# Patient Record
Sex: FEMALE | Race: WHITE | NOT HISPANIC OR LATINO | Employment: OTHER | URBAN - METROPOLITAN AREA
[De-identification: names, ages, dates, MRNs, and addresses within clinical notes are randomized per-mention and may not be internally consistent; named-entity substitution may affect disease eponyms.]

---

## 2024-06-17 PROBLEM — K42.9 UMBILICAL HERNIA WITHOUT OBSTRUCTION AND WITHOUT GANGRENE: Status: ACTIVE | Noted: 2024-06-17

## 2024-08-06 PROBLEM — I20.89 ANGINAL EQUIVALENT: Status: ACTIVE | Noted: 2024-08-06

## 2024-08-06 PROBLEM — I25.118 CORONARY ARTERY DISEASE OF NATIVE ARTERY OF NATIVE HEART WITH STABLE ANGINA PECTORIS: Status: ACTIVE | Noted: 2024-08-06

## 2024-08-06 PROBLEM — R94.39 ABNORMAL MYOCARDIAL PERFUSION STUDY: Status: ACTIVE | Noted: 2024-08-06

## 2024-08-26 ENCOUNTER — TELEPHONE (OUTPATIENT)
Dept: UROGYNECOLOGY | Facility: CLINIC | Age: 69
End: 2024-08-26

## 2024-08-26 NOTE — TELEPHONE ENCOUNTER
----- Message from Gisselle Arellano MD sent at 8/26/2024 10:34 AM CDT -----  Regarding: please call patient and schedule her  Hi: I don't see that we have we ever seen this patient. If she would like to make an appt, we can see her (christelle Noriega, Dr. JONES) in [place], or she can make appt (virtual) with any provider to talk about things 1st.  ----- Message -----  From: Shy Molina MA  Sent: 8/26/2024  10:03 AM CDT  To: Gisselle Arellano MD; Warren Willoughby PA-C    Please advise.  ----- Message -----  From: Angelia Brenner  Sent: 8/26/2024   9:33 AM CDT  To: Eileen Mccann    Pt need to speak with Dr. Arellano concerning her bladder at night she can be reached [phone]

## 2024-08-26 NOTE — TELEPHONE ENCOUNTER
Changed pt appointment to [place]. Pt agreed to time & place of new appointment.      ----- Message from Gisselle Arellano MD sent at 8/26/2024 10:34 AM CDT -----  Regarding: please call patient and schedule her  Hi: I don't see that we have we ever seen this patient. If she would like to make an appt, we can see her (christelle Noriega, Dr. JONES) in [place], or she can make appt (virtual) with any provider to talk about things 1st.  ----- Message -----  From: Shy Molina MA  Sent: 8/26/2024  10:03 AM CDT  To: Gisselle Arellano MD; Warren Willoughby PA-C    Please advise.  ----- Message -----  From: Angelia Brenner  Sent: 8/26/2024   9:33 AM CDT  To: Eileen TONY Staff    Pt need to speak with Dr. Arellano concerning her bladder at night she can be reached [phone]

## 2024-09-23 PROBLEM — I25.10 CORONARY ARTERY DISEASE: Status: ACTIVE | Noted: 2024-08-06

## 2024-11-13 NOTE — PROGRESS NOTES
[facility] UROGYN  [facility] [facility] [facility] [address], [address]  [facility] [address]    Laura Thomas  [id number]  1955  November 15, 2024    Consulting Physician: Self, Aaareferral   GYN: none  Primary M.D.: Blaze Muhammad MD    Chief Complaint   Patient presents with    Establish Care       CC: nocturia/UUI  History of Present Illness:    1)  Urinary incontinence:  --Do you leak have stress urinary leakage (leaking with cough, sneeze, exercise physical activity, etc)?  yes   --Do leak have urge urinary leakage (run to the bathroom but can't get there in time)? yes   --Which is worse? equal (if they are about equal, write equal)  --How many pads do you go through a day? 1/day  --How wet are your pads? minimum wetness   --How many pads do you go through a night? 1/day  --How wet are your pads? severe wetness . When she wakes up and gets out of bed she leaks  --How often to you urinate during the day?  Q 2 hours.    --How many times do you urinate at night/during sleeping hours? 3  --Do you have any pain or burning with urination? no   --Do you have any blood that you can see in your urine? no   --Do you get frequent bladder infections/UTIs? (>3 in 1 year or 2 in 6 months per documented, positive urine culture): no   --When urinating, do you feel like you empty your bladder completely? yes     --UDS 5/2024 ([facility]): low capacity, +DO; no BRANDON (report scanned into EPIC)  --tried oxybutynin--helped but had some trouble emptying  --tried mirabegron--helped but was too expensive    2)  Pelvic organ prolapse:  --Do you notice something bulging from your vagina? no   --If so, how far does it come out? Above, To, or Past the vaginal opening  --Do you have symptoms from the bulge?    --If so, what are those symptoms?   --Are you having any vaginal bleeding or abnormal menstrual bleeding? no   --Are you having any bothersome vaginal discharge? no   --Are you sexually active? yes   --Do you have pain with  intercourse? no   --If so, please describe the pain:   --Do you have vaginal dryness? yes    --If so, do you use vaginal estrogen cream for the dryness? yes     --2010: SANDRINE/BSO/McCalls + ant/post Avaulta (mesh)   --surgeon comments did not want to repeat TVT due to previous one and difficulty  --2000: sounds like had TVT    3)  Bowel habits:    --Are you often constipated? yes    --if eats fiber gummy + takes prunes daily, it's controlled  --Do you often have to strain with your bowel movements? no   --Is your stool consistency often hard/pellet-like? no   --Do you have diarrhea or loose stool on a regular basis? no   --Do you notice blood in your stool? no   --Do you loose control of your bowel movements on a regular basis? no   --Do you every have a strong urge to have a bowel movement and have a small amount of stool in your underwear when you get to the bathroom? no   --When you have a bowel movement, do you feel like you empty completely? yes    --If not, do you push in or around the vagina/anus to help empty? no   --Do you ever have tissue bulging from the anus that stays out? no     Past Medical History:  Past Medical History:   Diagnosis Date    Arthritis     legs and hands    Hiatal hernia     Hypertension     Umbilical hernia        Past Surgical History:  Past Surgical History:   Procedure Laterality Date    BLADDER SURGERY      CORONARY ANGIOPLASTY WITH STENT PLACEMENT Left 8/6/2024    Procedure: ANGIOPLASTY WITH STENT PLACEMENT;  Surgeon: Josh Howard MD;  Location: [facility] CATH;  Service: Cardiology;  Laterality: Left;  Schedule for LHC/coronary angio/possible PCI- patient prefers at [facility]    HYSTERECTOMY      LEFT HEART CATHETERIZATION Left 8/6/2024    Procedure: Left heart cath;  Surgeon: Josh Howard MD;  Location: [facility] CATH;  Service: Cardiology;  Laterality: Left;  Schedule for LHC/coronary angio/possible PCI- patient prefers at [facility]    REPAIR OF MENISCUS OF KNEE     --PP BTL  --2010:  SANDRINE/BSO/McCalls + ant/post Avaulta (mesh)   --surgeon comments did not want to repeat TVT due to previous one and difficulty  --2000: sounds like had TVT    Hysterectomy: yes   Date: 2010.  Indication: abnormal bleeding and prolapse bladder.    Type: TVH  Cervix present: unknown   Ovaries present: no   --2010: SANDRINE/BSO/McCalls + ant/post Avaulta (mesh)   --surgeon comments did not want to repeat TVT due to previous one and difficulty    Avaulta Plus® is a monofilament, four arm polypropylene mesh coated in the central part with a porous, cellular crosslinked collagen barrier, used for anterior wall prolapse. 272 A. Ariel et al. The polypropylene mesh is made of small fibers, which provide a thin cross section of the mesh without loss of strength and may also contribute to decreasing the size of encapsulation. The central part of the polypropylene mesh, but not the outer limit, is covered with a layer of porcine collagen.    Anterior Avaulta:   The endopelvic connective tissue was sharply  from the vaginal epithelium-first to the pubic ramus and then down to the level of the ischial spines. An anterior colporrhaphy was then performed using a delayed absorbable suture in an interrupted technique.  Bilateral small groin incisions were made at the most superior and inferior aspects of the medial border of the obturator foramen. The Avaulta Solo trocars were then sequentially passed through these incisions, with the superior incisions serving as the site for the distal mesh arms, and the inferior incisions for the proximal arms. Care was taken to place the proximal arms of the mesh through the obturator internus muscles approximately 1 cm above the ischial spines.  The mesh was loosely positioned by gently pulling on each arm, and then tacked down to the anterior colporrhaphy in the midline using delayed absorbable sutures.     Posterior Avaulta:  For placement of the posterior Avaulta Solo devices, a similar  hydrodissection was performed followed by a vertical incision through the full thickness of the vaginal epithelium-with care taken to enter the true rectovaginal space. Bilateral stab incisions were made in the buttocks 3 cm lateral to and 3 cm distal from the anus. Trocars were passed through these stab incisions horizontally into the ischiorectal fossa under finger guidance. The trocars were placed through the levator muscles just lateral to the rectum and just medial to the ischial spines, and the superior mesh arms were then set into place. The same buttocks incisions were used for the distal trocar passes. These passes were also performed via finger guidance-allowing the surgeon to pass the trocar from the buttocks to the perineal body bilaterally. The distal landmark for trocar placement was the junction between the bulbocavernosus and transverse perineal muscles. The mesh was then positioned and tacked down in the midline via delayed absorbable sutures. After each posterior needle pass (and before the mesh was actually pulled into place) a digital rectal examination was performed to make sure that no penetration of the rectum had occurred.      Obstetrical History:    OB History    Para Term  AB Living   3             SAB IAB Ectopic Multiple Live Births                  # Outcome Date GA Lbr Alex/2nd Weight Sex Type Anes PTL Lv   3       Vag-Spont      2       Vag-Spont      1       Vag-Spont        # of vaginal deliveries: 3  # of  sections:   Largest infant weight: 9.10  Use of forceps or vacuum to assist any delivery: yes   Episiotomy with any delivery: yes     Gynecological History:  LMP: No LMP recorded. Patient has had a hysterectomy.  Age of menarche: 12  Age of menopause:   Last pap test: year: .  Result: Normal; now post hyst  History of abnormal paps: no    History of STIs:  no   Mammogram: Date of last: .  Result: Normal  Colonoscopy: Date of  last: 2023.  Result:  Normal.  Repeat due:  2028.   DEXA:  Date of last: 2023.  Result:  Normal.  Repeat due:  2025.     Family History:  Family History   Problem Relation Name Age of Onset    Heart attack Mother      Kidney cancer Father        Colon CA: No  Breast CA: No  GYN CA (ovary, uterine): No   CA (kidney, bladder): Yes - Father    Social History:  Social History     Socioeconomic History    Marital status:    Tobacco Use    Smoking status: Never    Smokeless tobacco: Never   Substance and Sexual Activity    Alcohol use: Not Currently     Comment: occasionally    Drug use: Never     The patient is .  Resides in [place] [address].  Employment status: retired  for [employer] in [place], [place].    Allergies  Review of patient's allergies indicates:   Allergen Reactions    Levaquin [levofloxacin] Other (See Comments)    Rosuvastatin        Medications  Current Outpatient Medications on File Prior to Visit   Medication Sig Dispense Refill    albuterol (PROVENTIL/VENTOLIN HFA) 90 mcg/actuation inhaler Inhale 2 puffs into the lungs every 6 (six) hours as needed for Wheezing. Rescue      ALPRAZolam (XANAX) 0.25 MG tablet Take 0.25 mg by mouth daily as needed for Anxiety.      aspirin (ECOTRIN) 81 MG EC tablet Take 81 mg by mouth once daily.      buPROPion (WELLBUTRIN XL) 150 MG TB24 tablet Take 150 mg by mouth once daily.      eszopiclone (LUNESTA) 2 MG Tab Take 2 mg by mouth nightly as needed (insomnia).      ezetimibe (ZETIA) 10 mg tablet Take 10 mg by mouth once daily.      fluticasone propionate (FLONASE) 50 mcg/actuation nasal spray 1 spray by Each Nostril route daily as needed for Rhinitis.      losartan (COZAAR) 100 MG tablet Take 100 mg by mouth once daily.      meloxicam (MOBIC) 15 MG tablet Take 7.5 mg by mouth daily as needed for Pain.      metoprolol succinate (TOPROL-XL) 25 MG 24 hr tablet Take 25 mg by mouth once daily.      omega 3-dha-epa-fish oil (FISH OIL) 1,000 mg (120  "mg-180 mg) Cap Take 1 capsule by mouth Daily.      pravastatin (PRAVACHOL) 40 MG tablet Take 40 mg by mouth 2 (two) times a day.       No current facility-administered medications on file prior to visit.       Review of Systems A 14 point ROS was reviewed with pertinent positives as noted above in the history of present illness.      Constitutional: negative  Eyes: negative  Endocrine: negative  Gastrointestinal: negative  Cardiovascular: negative  Respiratory: negative  Allergic/Immunologic: negative  Integumentary: negative  Psychiatric: negative  Musculoskeletal: negative   Ear/Nose/Throat: negative  Neurologic: negative  Genitourinary: SEE HPI  Hematologic/Lymphatic: negative   Breast: negative    Urogynecologic Exam  /79   Pulse 70   Ht 5' 3" (1.6 m)   Wt 105.4 kg (232 lb 5.8 oz)   BMI 41.16 kg/m²     GENERAL APPEARANCE:  The patient is well-developed, well-nourished.   Neck:  Supple with no thyromegaly, no carotid bruits.  Heart:  Regular rate and rhythm, no murmurs, rubs or gallops.  Lungs:  Clear.  No CVA tenderness.  Abdomen:  Soft, nontender, nondistended, no hepatosplenomegaly. 2 cm umbilical hernia, reducible, NT.   Incisions:  none    PELVIC:    External genitalia:  Normal Bartholins, Skenes and labia bilaterally.    Urethra:  No caruncle, diverticulum or masses.  (+) hypermobility.    Vagina:  Atrophy (+) , no bladder masses or tender, no discharge.    Cervix:  absent  Uterus: uterus absent  Adnexa: Not palpable.    POP-Q:  Aa 0; Ba 0; C -8; Ap 0; Bp 0.  Genital hiatus 4, perineal body 3, total vaginal length 10-11.    NEUROLOGIC:  Cranial nerves 2 through 12 intact.  Strength 5/5.  DTRs 2+ lower extremities.  S2 through 4 normal.  Sacral reflexes intact.    EXT: HAIRSTON, 2+ pulses bilaterally, no C/C/E    COUGH STRESS TEST:  negative  KEGEL: 1 /5    RECTAL:    External:  Normal, (--) hemorrhoids, (--) dovetailing.   Internal:   (--) tenderness, (--) masses, Normal resting tone, Normal active " tone. Grossly heme NEG.     PVR: 60 mL    Impression    1. Vaginal atrophy    2. Urinary incontinence, mixed    3. Nocturia more than twice per night    4. Chronic constipation    5. Umbilical hernia without obstruction and without gangrene    6. Cystocele, midline    7. Rectocele, female        Initial Plan  The patient was counseled regarding these issues. The patient was given a summary sheet containing each of these issues with possible options for evaluation and management. When appropriate, we also reviewed computer-generated diagrams specific to their diagnoses..  All questions were addressed to the patient's satisfaction.    1)  Mixed urinary incontinence, urge > stress:    --urine C&S  --Empty bladder every 3 hours.  Empty well: wait a minute, lean forward on toilet.    --Avoid dietary irritants (see sheet).  Keep diary x 3-5 days to determine your irritants.  --KEGELS: do 10 in AM and 10 in PM, holding each x 10 seconds.  When you feel urge to go, STOP, KEGEL, and when urge has passed, then go to bathroom.  Consider PT in future.    --URGE: Try sanctura 20 mg up to 2x/day. Takes 2-4 weeks to see if will have effect.  For dry mouth: get sour, sugar free lozenge or gum.     --oxybutynin helped but had trouble emptying   --mirabegron helped bu too expensive  --STRESS:  Pessary vs. Sling.     2)  Nocturia (nighttime urination), UUI on way to BR:  --stop fluids 2 hours before bed: drinks 2 large Yetis before bed and during night  --no water by bed  --If have leg swelling:  Elevate feet above chest x 1 hour before bed to get excess fluid off.  Can also use support hose (knee highs).    --see if pessary helps    3)  Constipation:  --hydrate well  Controlling constipation may help bladder urgency/leakage and fiber may better control cholesterol and blood glucose.  Start daily fiber.  Take 1 tsp of fiber powder (psyllium or other sugar-free powder).  Mix in 8 oz of water.  Take x 3-5 days.  Then, increase fiber by  1 tsp every 3-5 days until stool is easy to pass.  Stop and continue at that dose.   Do not exceed 6 tsps/day.  May also use over the counter stool softener 1-2 x/day.  AVOID laxatives.  --straining can make vaginal bulge and umbilical hernia worse    4)  Umbilical hernia:  --saw surgeon and is planning repair    5)  Stage 2 cystocele/rectocele, recurrent:  --2010: SANDRINE/BSO/McCalls + ant/post Avaulta (mesh)   --surgeon comments did not want to repeat TVT due to previous one and difficulty   --Seems like Avaulta has mesh over entire anterior/posterior compartments: not sure would be able to do anterior/posterior repair--probably needs R-ASC  --PVR normal today; does feel some symptoms of incomplete emptying (PV to help) and U/F with nighttime UUI   --urethra kinked some with passage of I/O cath--may be from POP but TVT could make worse  --is bulge related to nighttime symptoms?   --could consider trial of pessary to see if helps symptoms   --if does, could consider surgical repair    6)  Vaginal atrophy (dryness):    --start Vaginal estrogen:  --Use 0.5 grams of estrogen cream in vagina with applicator or dime-sized amount with finger (as far as can reach internally) nightly x 2 weeks, then twice a week thereafter.  You can also apply a dime-sized amount with your finger around the vaginal opening and inner lips at same frequency.     --Vaginal estrogen may help to decrease pain related to dryness with intercourse and urinary symptoms (urgency/frequency/UTIs) around menopause.   --sign up for [employer] Jovanny pham online--tube should be $20-30  Pharmacy Contact    Telephone Fax   [phone]     Pharmacy Address and Hours    Address Hours   [address] [facility] [place] [address]        7)  RTC for pessary fitting.     Approximately 60 min were spent in consult, 90 % in discussion.     Thank you for requesting consultation of your patient.  I look forward to participating in their  arlen.    Gisselle Arellano  Female Pelvic Medicine and Reconstructive Surgery  [facility] [place], [place]

## 2024-11-15 ENCOUNTER — OFFICE VISIT (OUTPATIENT)
Dept: UROGYNECOLOGY | Facility: CLINIC | Age: 69
End: 2024-11-15
Payer: MEDICARE

## 2024-11-15 VITALS
DIASTOLIC BLOOD PRESSURE: 79 MMHG | HEART RATE: 70 BPM | BODY MASS INDEX: 41.18 KG/M2 | SYSTOLIC BLOOD PRESSURE: 125 MMHG | HEIGHT: 63 IN | WEIGHT: 232.38 LBS

## 2024-11-15 DIAGNOSIS — N81.11 CYSTOCELE, MIDLINE: ICD-10-CM

## 2024-11-15 DIAGNOSIS — N95.2 VAGINAL ATROPHY: Primary | ICD-10-CM

## 2024-11-15 DIAGNOSIS — K59.09 CHRONIC CONSTIPATION: ICD-10-CM

## 2024-11-15 DIAGNOSIS — N39.46 URINARY INCONTINENCE, MIXED: ICD-10-CM

## 2024-11-15 DIAGNOSIS — K42.9 UMBILICAL HERNIA WITHOUT OBSTRUCTION AND WITHOUT GANGRENE: ICD-10-CM

## 2024-11-15 DIAGNOSIS — R35.1 NOCTURIA MORE THAN TWICE PER NIGHT: ICD-10-CM

## 2024-11-15 DIAGNOSIS — N81.6 RECTOCELE, FEMALE: ICD-10-CM

## 2024-11-15 PROCEDURE — 87186 SC STD MICRODIL/AGAR DIL: CPT | Performed by: OBSTETRICS & GYNECOLOGY

## 2024-11-15 RX ORDER — TROSPIUM CHLORIDE 20 MG/1
20 TABLET, FILM COATED ORAL 2 TIMES DAILY
Qty: 60 TABLET | Refills: 11 | Status: SHIPPED | OUTPATIENT
Start: 2024-11-15 | End: 2025-11-15

## 2024-11-15 RX ORDER — ESTRADIOL 0.1 MG/G
CREAM VAGINAL
Qty: 42.5 G | Refills: 11 | Status: SHIPPED | OUTPATIENT
Start: 2024-11-15

## 2024-11-15 NOTE — PATIENT INSTRUCTIONS
Bladder Irritants  Certain foods and drinks have been associated with worsening symptoms of urinary frequency, urgency, urge incontinence, or bladder pain. If you suffer from any of these conditions, you may wish to try eliminating one or more of these foods from your diet and see if your symptoms improve. If bladder symptoms are related to dietary factors, strict adherence to a diet thateliminates the food should bring marked relief in 10 days. Once you are feeling better, you can begin to add foods back into your diet, one at a time. If symptoms return, you will be able to identify the irritant. As you add foods back to your diet it is very important that you drink significant amounts of water.    -----------------------------------------------------------------------------------------------  List of Common Bladder Irritants*  Alcoholic beverages  Apples and apple juice  Cantaloupe  Carbonated beverages  Chili and spicy foods  Chocolate  Citrus fruit  Coffee (including decaffeinated)  Cranberries and cranberry juice  Grapes  Guava  Milk Products: milk, cheese, cottage cheese, yogurt, ice cream  Peaches  Pineapple  Plums  Strawberries  Sugar especially artificial sweeteners, saccharin, aspartame, corn sweeteners, honey, fructose, sucrose, lactose  Tea  Tomatoes and tomato juice  Vitamin B complex  Vinegar  *Most people are not sensitive to ALL of these products; your goal is to find the foods that make YOUR symptoms worse.  ---------------------------------------------------------------------------------------------------    Low-acid fruit substitutions include apricots, papaya, pears and watermelon. Coffee drinkers can drink Kava or other lowacid instant drinks. Tea drinkers can substitute non-citrus herbal and sun brewed teas. Calcium carbonate co-buffered with calcium ascorbate can be substituted for Vitamin C. Prelief is a dietary supplement that works as an acid blocker for the bladder.    Where to get more  information:        Overcoming Bladder Disorders by Sushila Chamorro and Ash Perkins, 1990        You Dont Have to Live with Cystitis! By Lucita Gamino, 1988  http://www.urologymanagement.org/oab  -------------------------------------------  1)  Mixed urinary incontinence, urge > stress:    --urine C&S  --Empty bladder every 3 hours.  Empty well: wait a minute, lean forward on toilet.    --Avoid dietary irritants (see sheet).  Keep diary x 3-5 days to determine your irritants.  --KEGELS: do 10 in AM and 10 in PM, holding each x 10 seconds.  When you feel urge to go, STOP, KEGEL, and when urge has passed, then go to bathroom.  Consider PT in future.    --URGE: Try sanctura 20 mg up to 2x/day. Takes 2-4 weeks to see if will have effect.  For dry mouth: get sour, sugar free lozenge or gum.     --oxybutynin helped but had trouble emptying   --mirabegron helped bu too expensive  --STRESS:  Pessary vs. Sling.     2)  Nocturia (nighttime urination), UUI on way to BR:  --stop fluids 2 hours before bed: drinks 2 large Yetis before bed and during night  --no water by bed  --If have leg swelling:  Elevate feet above chest x 1 hour before bed to get excess fluid off.  Can also use support hose (knee highs).    --see if pessary helps    3)  Constipation:  --hydrate well  Controlling constipation may help bladder urgency/leakage and fiber may better control cholesterol and blood glucose.  Start daily fiber.  Take 1 tsp of fiber powder (psyllium or other sugar-free powder).  Mix in 8 oz of water.  Take x 3-5 days.  Then, increase fiber by 1 tsp every 3-5 days until stool is easy to pass.  Stop and continue at that dose.   Do not exceed 6 tsps/day.  May also use over the counter stool softener 1-2 x/day.  AVOID laxatives.  --straining can make vaginal bulge and umbilical hernia worse    4)  Umbilical hernia:  --saw surgeon and is planning repair    5)  Stage 2 cystocele/rectocele,  recurrent:  --2010: SANDRINE/BSO/McCalls + ant/post Avaulta (mesh)   --surgeon comments did not want to repeat TVT due to previous one and difficulty   --need to see where Avault attaches  --PVR normal today; does feel some symptoms of incomplete emptying (PV to help) and U/F with nighttime UUI  --is bulge related to nighttime symptoms?   --could consider trial of pessary to see if helps symptoms   --if does, could consider surgical repair    6)  Vaginal atrophy (dryness):    --start Vaginal estrogen:  --Use 0.5 grams of estrogen cream in vagina with applicator or dime-sized amount with finger (as far as can reach internally) nightly x 2 weeks, then twice a week thereafter.  You can also apply a dime-sized amount with your finger around the vaginal opening and inner lips at same frequency.     --Vaginal estrogen may help to decrease pain related to dryness with intercourse and urinary symptoms (urgency/frequency/UTIs) around menopause.   --sign up for [employer] Jovanny pham online--tube should be $20-30  Pharmacy Contact    Telephone Fax   [phone]     Pharmacy Address and Hours    Address Hours   [address] [facility] [place] [place] [address]        7)  RTC for pessary fitting.

## 2024-11-16 LAB — BACTERIA UR CULT: ABNORMAL

## 2024-11-17 DIAGNOSIS — N39.0 ACUTE UTI: Primary | ICD-10-CM

## 2024-11-17 RX ORDER — AMOXICILLIN AND CLAVULANATE POTASSIUM 875; 125 MG/1; MG/1
1 TABLET, FILM COATED ORAL 2 TIMES DAILY
Qty: 10 TABLET | Refills: 0 | Status: SHIPPED | OUTPATIENT
Start: 2024-11-17 | End: 2024-11-22

## 2024-11-18 ENCOUNTER — TELEPHONE (OUTPATIENT)
Dept: UROGYNECOLOGY | Facility: CLINIC | Age: 69
End: 2024-11-18
Payer: MEDICARE

## 2024-11-18 NOTE — TELEPHONE ENCOUNTER
Contacted patient to relay Dr. Arellano's below message. Patient verbalized understanding and denies other needs at this time. Call ended.      ----- Message from Gisselle Arellano MD sent at 11/17/2024  6:14 PM CST -----  Please let patient know that she has a UTI. I sent augmentin to her pharmacy to /start.  I'm hoping the vaginal estrogen and the pessary will help reduce chances of these. THanks!

## 2024-11-18 NOTE — TELEPHONE ENCOUNTER
Message handled via separate encounter.     ----- Message from Enriqueta sent at 11/16/2024  8:59 AM CST -----  Type:  Needs Medical Advice    Who Called: Pt  Would the patient rather a call back or a response via [facility]? Call  Best Call Back Number:  [phone]  Additional Information: Pt would like to speak to someone in office related to a prescription

## 2024-11-18 NOTE — TELEPHONE ENCOUNTER
Assisted patient scheduling appt.     ----- Message from Gisselle Arellano MD sent at 11/15/2024  5:20 PM CST -----  Regarding: please help patient schedule pessary fitting with Leann  please help patient schedule pessary fitting with Le[facility] -- [facility]  Thanks!

## 2024-11-19 ENCOUNTER — TELEPHONE (OUTPATIENT)
Dept: UROGYNECOLOGY | Facility: CLINIC | Age: 69
End: 2024-11-19
Payer: MEDICARE

## 2024-11-19 NOTE — TELEPHONE ENCOUNTER
Relayed message to patient. She voiced understanding.    ----- Message from Gisselle Arellano MD sent at 11/17/2024  6:14 PM CST -----  Please let patient know that she has a UTI. I sent augmentin to her pharmacy to /start.  I'm hoping the vaginal estrogen and the pessary will help reduce chances of these. THanks!

## 2025-02-13 ENCOUNTER — OFFICE VISIT (OUTPATIENT)
Dept: UROGYNECOLOGY | Facility: CLINIC | Age: 70
End: 2025-02-13
Payer: MEDICARE

## 2025-02-13 VITALS
WEIGHT: 234.13 LBS | BODY MASS INDEX: 41.48 KG/M2 | HEIGHT: 63 IN | SYSTOLIC BLOOD PRESSURE: 140 MMHG | DIASTOLIC BLOOD PRESSURE: 88 MMHG

## 2025-02-13 DIAGNOSIS — N81.6 RECTOCELE, FEMALE: ICD-10-CM

## 2025-02-13 DIAGNOSIS — N39.46 MIXED STRESS AND URGE URINARY INCONTINENCE: ICD-10-CM

## 2025-02-13 DIAGNOSIS — K59.09 CHRONIC CONSTIPATION: ICD-10-CM

## 2025-02-13 DIAGNOSIS — R35.1 NOCTURIA MORE THAN TWICE PER NIGHT: ICD-10-CM

## 2025-02-13 DIAGNOSIS — N81.11 CYSTOCELE, MIDLINE: ICD-10-CM

## 2025-02-13 DIAGNOSIS — Z46.89 ENCOUNTER FOR FITTING AND ADJUSTMENT OF PESSARY: Primary | ICD-10-CM

## 2025-02-13 LAB
BILIRUB SERPL-MCNC: NORMAL MG/DL
BLOOD URINE, POC: NORMAL
CLARITY, POC UA: CLEAR
COLOR, POC UA: YELLOW
GLUCOSE UR QL STRIP: NORMAL
KETONES UR QL STRIP: NORMAL
LEUKOCYTE ESTERASE URINE, POC: NORMAL
NITRITE, POC UA: NORMAL
PH, POC UA: 5
PROTEIN, POC: NORMAL
SPECIFIC GRAVITY, POC UA: 1.01
UROBILINOGEN, POC UA: NORMAL

## 2025-02-13 PROCEDURE — 99999PBSHW POCT URINE DIPSTICK WITHOUT MICROSCOPE: Mod: PBBFAC,,,

## 2025-02-13 PROCEDURE — 87088 URINE BACTERIA CULTURE: CPT | Performed by: NURSE PRACTITIONER

## 2025-02-13 PROCEDURE — 99213 OFFICE O/P EST LOW 20 MIN: CPT | Mod: PBBFAC | Performed by: NURSE PRACTITIONER

## 2025-02-13 PROCEDURE — 99999 PR PBB SHADOW E&M-EST. PATIENT-LVL III: CPT | Mod: PBBFAC,,, | Performed by: NURSE PRACTITIONER

## 2025-02-13 PROCEDURE — 99215 OFFICE O/P EST HI 40 MIN: CPT | Mod: S$PBB,,, | Performed by: NURSE PRACTITIONER

## 2025-02-13 PROCEDURE — 87086 URINE CULTURE/COLONY COUNT: CPT | Performed by: NURSE PRACTITIONER

## 2025-02-13 PROCEDURE — 81002 URINALYSIS NONAUTO W/O SCOPE: CPT | Mod: PBBFAC | Performed by: NURSE PRACTITIONER

## 2025-02-13 PROCEDURE — 87186 SC STD MICRODIL/AGAR DIL: CPT | Performed by: NURSE PRACTITIONER

## 2025-02-13 NOTE — PATIENT INSTRUCTIONS
PESSARY CARE: Remove pessary as frequently as nightly and as infrequently as every 2-3 weeks. Remove before intercourse. May need to remove before bowel movements if straining dislodges. Wash with soap and water (no ). Replace using small amount of water-based lubricant (like KY jelly) at end being introduced into vagina.     Inserting the pessary  Wash your hands.  The notches inside the open ring and the openings in the ring-with-support are the flexible points. Grasp the device midway between these points and fold the pessary in half. The curved part should be facing the ceiling, like a taco. Put a small amount of water-soluble lubricant, such as KY Jelly, on the insertion edge.   Hold the folded pessary in one hand and spread the lips of your vagina with the other hand. Gently push the pessary as far back into the vagina as it will go. You can do this squatting, standing with one foot propped on the tub or toilet, or sitting with your feet propped up.    Removing the pessary  Wash your hands.  Find the rim of the pessary just under the pubic bone at the front of your vagina. Locate the notch or opening and hook your finger under or over the rim.  Tilt the pessary slightly, to about a 30 degree angle, and gently pull down and out of the vagina. If you can fold the pessary somewhat, it will ease the removal.  Bearing down as if you are having a bowel movement can help push the rim of the pessary forward so you can grasp it more easily.    1)  Mixed urinary incontinence, urge > stress:    --urine C&S  --Empty bladder every 3 hours.  Empty well: wait a minute, lean forward on toilet.    --Avoid dietary irritants (see sheet).  Keep diary x 3-5 days to determine your irritants.  --KEGELS: do 10 in AM and 10 in PM, holding each x 10 seconds.  When you feel urge to go, STOP, KEGEL, and when urge has passed, then go to bathroom.  Consider PT in future.    --URGE: Try sanctura 20 mg up to 2x/day. Takes 2-4 weeks  to see if will have effect.  For dry mouth: get sour, sugar free lozenge or gum.     --oxybutynin helped but had trouble emptying   --mirabegron helped bu too expensive  --STRESS:  Pessary vs. Sling.     2)  Nocturia (nighttime urination), UUI on way to BR:  --stop fluids 2 hours before bed  --no water by bed  --If have leg swelling:  Elevate feet above chest x 1 hour before bed to get excess fluid off.  Can also use support hose (knee highs).    --see if pessary helps    3)  Constipation:  --hydrate well  Controlling constipation may help bladder urgency/leakage and fiber may better control cholesterol and blood glucose.  Start daily fiber.  Take 1 tsp of fiber powder (psyllium or other sugar-free powder).  Mix in 8 oz of water.  Take x 3-5 days.  Then, increase fiber by 1 tsp every 3-5 days until stool is easy to pass.  Stop and continue at that dose.   Do not exceed 6 tsps/day.  May also use over the counter stool softener 1-2 x/day.  AVOID laxatives.  --straining can make vaginal bulge and umbilical hernia worse    4)  Stage 2 cystocele/rectocele, recurrent:  --2010: SANDRINE/BSO/McCalls + ant/post Avaulta (mesh)   --surgeon comments did not want to repeat TVT due to previous one and difficulty   --Seems like Avaulta has mesh over entire anterior/posterior compartments: not sure would be able to do anterior/posterior repair--probably needs R-ASC  --PVR normal today; does feel some symptoms of incomplete emptying (PV to help) and U/F with nighttime UUI   --urethra kinked some with passage of I/O cath--may be from POP but TVT could make worse  --is bulge related to nighttime symptoms?   --could consider trial of pessary to see if helps symptoms   --if does, could consider surgical repair    5)  Vaginal atrophy (dryness):    --continue Vaginal estrogen:  --Use 0.5 grams of estrogen cream in vagina with applicator or dime-sized amount with finger (as far as can reach internally) nightly x 2 weeks, then twice a week  thereafter.  You can also apply a dime-sized amount with your finger around the vaginal opening and inner lips at same frequency.     --Vaginal estrogen may help to decrease pain related to dryness with intercourse and urinary symptoms (urgency/frequency/UTIs) around menopause.

## 2025-02-13 NOTE — PROGRESS NOTES
[facility] - UROGYNECOLOGY  [address] [place] [id number]    Laura Thomas  [id number]  1955 February 13, 2025    Consulting Physician: Self, Aaareferral   GYN: none  Primary M.D.: Blaze Muhammad MD    No chief complaint on file.      CC: nocturia/UUI    11/15/2024  History of Present Illness:    1)  Urinary incontinence:  --Do you leak have stress urinary leakage (leaking with cough, sneeze, exercise physical activity, etc)?  yes   --Do leak have urge urinary leakage (run to the bathroom but can't get there in time)? yes   --Which is worse? equal (if they are about equal, write equal)  --How many pads do you go through a day? 1/day  --How wet are your pads? minimum wetness   --How many pads do you go through a night? 1/day  --How wet are your pads? severe wetness . When she wakes up and gets out of bed she leaks  --How often to you urinate during the day?  Q 2 hours.    --How many times do you urinate at night/during sleeping hours? 3  --Do you have any pain or burning with urination? no   --Do you have any blood that you can see in your urine? no   --Do you get frequent bladder infections/UTIs? (>3 in 1 year or 2 in 6 months per documented, positive urine culture): no   --When urinating, do you feel like you empty your bladder completely? yes     --UDS 5/2024 ([facility]): low capacity, +DO; no BRANDON (report scanned into EPIC)  --tried oxybutynin--helped but had some trouble emptying  --tried mirabegron--helped but was too expensive    2)  Pelvic organ prolapse:  --Do you notice something bulging from your vagina? no   --If so, how far does it come out? Above, To, or Past the vaginal opening  --Do you have symptoms from the bulge?    --If so, what are those symptoms?   --Are you having any vaginal bleeding or abnormal menstrual bleeding? no   --Are you having any bothersome vaginal discharge? no   --Are you sexually active? yes   --Do you have pain with intercourse? no   --If so, please  describe the pain:   --Do you have vaginal dryness? yes    --If so, do you use vaginal estrogen cream for the dryness? yes     --2010: SANDRINE/BSO/McCalls + ant/post Avaulta (mesh)   --surgeon comments did not want to repeat TVT due to previous one and difficulty  --2000: sounds like had TVT    3)  Bowel habits:    --Are you often constipated? yes    --if eats fiber gummy + takes prunes daily, it's controlled  --Do you often have to strain with your bowel movements? no   --Is your stool consistency often hard/pellet-like? no   --Do you have diarrhea or loose stool on a regular basis? no   --Do you notice blood in your stool? no   --Do you loose control of your bowel movements on a regular basis? no   --Do you every have a strong urge to have a bowel movement and have a small amount of stool in your underwear when you get to the bathroom? no   --When you have a bowel movement, do you feel like you empty completely? yes    --If not, do you push in or around the vagina/anus to help empty? no   --Do you ever have tissue bulging from the anus that stays out? no     POP-Q:  Aa 0; Ba 0; C -8; Ap 0; Bp 0.  Genital hiatus 4, perineal body 3, total vaginal length 10-11.    PVR: 60 mL    Changes since last visit   She has yet to start Sanctura. Has stopped fluids prior to bedtime w/ relief. Taking Colace for bowel movements with overall relief. Has been using vaginal estrogen but not consistently. Would like to manage pessary herself. Is still sexually active w/ .    Past Medical History:  Past Medical History:   Diagnosis Date    Arthritis     legs and hands    Hiatal hernia     Hypertension     Umbilical hernia        Past Surgical History:  Past Surgical History:   Procedure Laterality Date    BLADDER SURGERY      CORONARY ANGIOPLASTY WITH STENT PLACEMENT Left 8/6/2024    Procedure: ANGIOPLASTY WITH STENT PLACEMENT;  Surgeon: Josh Howard MD;  Location: [facility] CATH;  Service: Cardiology;  Laterality: Left;  Schedule  for LHC/coronary angio/possible PCI[place] [facility] [place]STERECTOMY      LEFT HEART CATHETERIZATION Left 8/6/2024    Procedure: Left heart cath;  Surgeon: Josh Howard MD;  Location: [facility] CATH;  Service: Cardiology;  Laterality: Left;  Schedule for LHC/coronary angio/possible PCI- [place] [facility] [place]    REPAIR OF MENISCUS OF KNEE     --PP BTL  --2010: SANDRINE/BSO/McCalls + ant/post Avaulta (mesh)   --surgeon comments did not want to repeat TVT due to previous one and difficulty  --2000: sounds like had TVT    Hysterectomy: yes   Date: 2010.  Indication: abnormal bleeding and prolapse bladder.    Type: TVH  Cervix present: unknown   Ovaries present: no   --2010: SANDRINE/BSO/McCalls + ant/post Avaulta (mesh)   --surgeon comments did not want to repeat TVT due to previous one and difficulty    Avaulta Plus® is a monofilament, four arm polypropylene mesh coated in the central part with a porous, cellular crosslinked collagen barrier, used for anterior wall prolapse. 272 A. Ariel et al. The polypropylene mesh is made of small fibers, which provide a thin cross section of the mesh without loss of strength and may also contribute to decreasing the size of encapsulation. The central part of the polypropylene mesh, but not the outer limit, is covered with a layer of porcine collagen.    Anterior Avaulta:   The endopelvic connective tissue was sharply  from the vaginal epithelium-first to the pubic ramus and then down to the level of the ischial spines. An anterior colporrhaphy was then performed using a delayed absorbable suture in an interrupted technique.  Bilateral small groin incisions were made at the most superior and inferior aspects of the medial border of the obturator foramen. The Avaulta Solo trocars were then sequentially passed through these incisions, with the superior incisions serving as the site for the distal mesh arms, and the inferior incisions for the proximal arms. Care was taken to  place the proximal arms of the mesh through the obturator internus muscles approximately 1 cm above the ischial spines.  The mesh was loosely positioned by gently pulling on each arm, and then tacked down to the anterior colporrhaphy in the midline using delayed absorbable sutures.     Posterior Avaulta:  For placement of the posterior Avaulta Solo devices, a similar hydrodissection was performed followed by a vertical incision through the full thickness of the vaginal epithelium-with care taken to enter the true rectovaginal space. Bilateral stab incisions were made in the buttocks 3 cm lateral to and 3 cm distal from the anus. Trocars were passed through these stab incisions horizontally into the ischiorectal fossa under finger guidance. The trocars were placed through the levator muscles just lateral to the rectum and just medial to the ischial spines, and the superior mesh arms were then set into place. The same buttocks incisions were used for the distal trocar passes. These passes were also performed via finger guidance-allowing the surgeon to pass the trocar from the buttocks to the perineal body bilaterally. The distal landmark for trocar placement was the junction between the bulbocavernosus and transverse perineal muscles. The mesh was then positioned and tacked down in the midline via delayed absorbable sutures. After each posterior needle pass (and before the mesh was actually pulled into place) a digital rectal examination was performed to make sure that no penetration of the rectum had occurred.      Obstetrical History:    OB History    Para Term  AB Living   3             SAB IAB Ectopic Multiple Live Births                  # Outcome Date GA Lbr Alex/2nd Weight Sex Type Anes PTL Lv   3       Vag-Spont      2       Vag-Spont      1       Vag-Spont        # of vaginal deliveries: 3  # of  sections:   Largest infant weight: 9.10  Use of forceps or vacuum to  assist any delivery: yes   Episiotomy with any delivery: yes     Gynecological History:  LMP: No LMP recorded. Patient has had a hysterectomy.  Age of menarche: 12  Age of menopause:   Last pap test: year: 2010.  Result: Normal; now post hyst  History of abnormal paps: no    History of STIs:  no   Mammogram: Date of last: 2023.  Result: Normal  Colonoscopy: Date of last: 2023.  Result:  Normal.  Repeat due:  2028.   DEXA:  Date of last: 2023.  Result:  Normal.  Repeat due:  2025.     Family History:  Family History   Problem Relation Name Age of Onset    Heart attack Mother      Kidney cancer Father        Colon CA: No  Breast CA: No  GYN CA (ovary, uterine): No   CA (kidney, bladder): Yes - Father    Social History:  Social History     Socioeconomic History    Marital status:    Tobacco Use    Smoking status: Never    Smokeless tobacco: Never   Substance and Sexual Activity    Alcohol use: Not Currently     Comment: occasionally    Drug use: Never     The patient is .  Resides in [place] [address].  Employment status: retired  for [employer] in [place], [place].    Allergies  Review of patient's allergies indicates:   Allergen Reactions    Levaquin [levofloxacin] Other (See Comments)    Rosuvastatin        Medications  Current Outpatient Medications on File Prior to Visit   Medication Sig Dispense Refill    albuterol (PROVENTIL/VENTOLIN HFA) 90 mcg/actuation inhaler Inhale 2 puffs into the lungs every 6 (six) hours as needed for Wheezing. Rescue      ALPRAZolam (XANAX) 0.25 MG tablet Take 0.25 mg by mouth daily as needed for Anxiety.      aspirin (ECOTRIN) 81 MG EC tablet Take 81 mg by mouth once daily.      buPROPion (WELLBUTRIN XL) 150 MG TB24 tablet Take 150 mg by mouth once daily.      estradioL (ESTRACE) 0.01 % (0.1 mg/gram) vaginal cream 0.5 grams with applicator or dime-sized amount with finger in vagina nightly x 2 weeks, then twice a week thereafter 42.5 g 11    eszopiclone  "(LUNESTA) 2 MG Tab Take 2 mg by mouth nightly as needed (insomnia).      ezetimibe (ZETIA) 10 mg tablet Take 10 mg by mouth once daily.      fluticasone propionate (FLONASE) 50 mcg/actuation nasal spray 1 spray by Each Nostril route daily as needed for Rhinitis.      HYDROcodone-acetaminophen (NORCO) 5-325 mg per tablet Take 1 tablet by mouth every 4 (four) hours as needed for Pain. 30 tablet 0    inclisiran (LEQVIO) 284 mg/1.5 mL Syrg subcutaneous injection Inject 284 mg into the skin every 6 (six) months.      losartan (COZAAR) 100 MG tablet Take 100 mg by mouth once daily.      meloxicam (MOBIC) 15 MG tablet Take 7.5 mg by mouth daily as needed for Pain.      metoprolol succinate (TOPROL-XL) 25 MG 24 hr tablet Take 25 mg by mouth once daily.      omega-3 fatty acids/fish oil (FISH OIL-OMEGA-3 FATTY ACIDS) 300-1,000 mg capsule Take 1 capsule by mouth once daily.      pravastatin (PRAVACHOL) 40 MG tablet Take 80 mg by mouth once daily.       No current facility-administered medications on file prior to visit.       Review of Systems   See HPI    Urogynecologic Exam  BP (!) 140/88   Ht 5' 3" (1.6 m)   Wt 106.2 kg (234 lb 2.1 oz)   BMI 41.47 kg/m²     GENERAL APPEARANCE:  The patient is a well-developed, well-nourished female.    PELVIC:    External genitalia:  Normal Bartholins, Skenes and labia bilaterally.    Urethra:  No caruncle, diverticulum or masses.    Vagina:  Atrophy (+) , no bladder masses or tender, no discharge.    Cervix:  absent  Uterus: uterus absent  Adnexa: Not examined.    POP-Q:    Deferred.      RECTAL:    Deferred    PVR: 5 mL    The patient was fit with #4 ring w/ support pessary.  She was able to tolerate the device comfortabley with bending, squatting, valsalva.  She was able to demonstrate independent removal and placement.  She tolerated the procedure well.          Impression    1. Encounter for fitting and adjustment of pessary    2. Cystocele, midline    3. Rectocele, female    4. " Mixed stress and urge urinary incontinence    5. Nocturia more than twice per night    6. Chronic constipation          1)  Mixed urinary incontinence, urge > stress:    --urine C&S  --Empty bladder every 3 hours.  Empty well: wait a minute, lean forward on toilet.    --Avoid dietary irritants (see sheet).  Keep diary x 3-5 days to determine your irritants.  --KEGELS: do 10 in AM and 10 in PM, holding each x 10 seconds.  When you feel urge to go, STOP, KEGEL, and when urge has passed, then go to bathroom.  Consider PT in future.    --URGE: Try sanctura 20 mg up to 2x/day. Takes 2-4 weeks to see if will have effect.  For dry mouth: get sour, sugar free lozenge or gum.  --> Not started    --oxybutynin helped but had trouble emptying   --mirabegron helped bu too expensive  --STRESS:  Pessary vs. Sling.     2)  Nocturia (nighttime urination), UUI on way to BR:  --stop fluids 2 hours before bed: drinks 2 large Yetis before bed and during night   --no water by bed  --If have leg swelling:  Elevate feet above chest x 1 hour before bed to get excess fluid off.  Can also use support hose (knee highs).    --see if pessary helps    3)  Constipation: --> taking colace w/ relief   --hydrate well  Controlling constipation may help bladder urgency/leakage and fiber may better control cholesterol and blood glucose.  Start daily fiber.  Take 1 tsp of fiber powder (psyllium or other sugar-free powder).  Mix in 8 oz of water.  Take x 3-5 days.  Then, increase fiber by 1 tsp every 3-5 days until stool is easy to pass.  Stop and continue at that dose.   Do not exceed 6 tsps/day.  May also use over the counter stool softener 1-2 x/day.  AVOID laxatives.  --straining can make vaginal bulge and umbilical hernia worse    4)  Umbilical hernia:  --s/p repair     5)  Stage 2 cystocele/rectocele, recurrent:  ----Fit w/ #4 ring w/ support pessary - ordered from [facility] - will mail directly to patient   --2010: SANDRINE/NOEMI/Mariah + ant/post Zuleyka  (mesh)   --surgeon comments did not want to repeat TVT due to previous one and difficulty   --Seems like Zuleyka has mesh over entire anterior/posterior compartments: not sure would be able to do anterior/posterior repair--probably needs R-ASC  --PVR normal today; does feel some symptoms of incomplete emptying (PV to help) and U/F with nighttime UUI   --urethra kinked some with passage of I/O cath--may be from POP but TVT could make worse    --smooth passage of catheter w/ pessary in place   --is bulge related to nighttime symptoms?   --could consider trial of pessary to see if helps symptoms   --if does, could consider surgical repair    6)  Vaginal atrophy (dryness):    --start Vaginal estrogen:  --Use 0.5 grams of estrogen cream in vagina with applicator or dime-sized amount with finger (as far as can reach internally) nightly x 2 weeks, then twice a week thereafter.  You can also apply a dime-sized amount with your finger around the vaginal opening and inner lips at same frequency.     --Vaginal estrogen may help to decrease pain related to dryness with intercourse and urinary symptoms (urgency/frequency/UTIs) around menopause.   --sign up for [employer] pham online--tube should be $20-30  Pharmacy Contact    Telephone Fax   [phone]     Pharmacy Address and Hours    Address Hours   [address]  [facility] [address]        RTC in 6 weeks for pessary check     Face to Face time with patient: 40    45 minutes of total time spent on the encounter, which includes face to face time and non-face to face time preparing to see the patient (eg, review of tests), Obtaining and/or reviewing separately obtained history, Documenting clinical information in the electronic or other health record, Independently interpreting results (not separately reported) and communicating results to the patient/family/caregiver, or Care coordination (not separately reported).     Tatiana Moon  Female  Pelvic Medicine and Reconstructive Surgery  [facility] [place], [place]

## 2025-02-14 LAB — BACTERIA UR CULT: ABNORMAL

## 2025-02-17 ENCOUNTER — PATIENT MESSAGE (OUTPATIENT)
Dept: OBSTETRICS AND GYNECOLOGY | Facility: CLINIC | Age: 70
End: 2025-02-17
Payer: MEDICARE

## 2025-02-17 DIAGNOSIS — N30.00 ACUTE CYSTITIS WITHOUT HEMATURIA: Primary | ICD-10-CM

## 2025-02-17 RX ORDER — CEPHALEXIN 500 MG/1
500 CAPSULE ORAL EVERY 12 HOURS
Qty: 14 CAPSULE | Refills: 0 | Status: SHIPPED | OUTPATIENT
Start: 2025-02-17 | End: 2025-02-24

## 2025-02-17 RX ORDER — CEPHALEXIN 250 MG/1
250 CAPSULE ORAL DAILY
Qty: 30 CAPSULE | Refills: 0 | Status: SHIPPED | OUTPATIENT
Start: 2025-02-17

## 2025-02-17 NOTE — TELEPHONE ENCOUNTER
Spoke w/ pt. Recommended Keflex 500 mg BID x 7 days, then Keflex 250 mg daily for 30 days.   Will repeat urine culture at follow up visit.   All questioned answered.

## 2025-03-27 ENCOUNTER — OFFICE VISIT (OUTPATIENT)
Dept: UROGYNECOLOGY | Facility: CLINIC | Age: 70
End: 2025-03-27
Payer: MEDICARE

## 2025-03-27 VITALS
DIASTOLIC BLOOD PRESSURE: 90 MMHG | WEIGHT: 233.44 LBS | BODY MASS INDEX: 41.36 KG/M2 | SYSTOLIC BLOOD PRESSURE: 140 MMHG | HEIGHT: 63 IN

## 2025-03-27 DIAGNOSIS — N81.6 RECTOCELE, FEMALE: ICD-10-CM

## 2025-03-27 DIAGNOSIS — R35.1 NOCTURIA MORE THAN TWICE PER NIGHT: ICD-10-CM

## 2025-03-27 DIAGNOSIS — Z46.89 PESSARY MAINTENANCE: Primary | ICD-10-CM

## 2025-03-27 DIAGNOSIS — N39.46 MIXED STRESS AND URGE URINARY INCONTINENCE: ICD-10-CM

## 2025-03-27 DIAGNOSIS — N81.11 CYSTOCELE, MIDLINE: ICD-10-CM

## 2025-03-27 DIAGNOSIS — K59.09 CHRONIC CONSTIPATION: ICD-10-CM

## 2025-03-27 PROCEDURE — 87086 URINE CULTURE/COLONY COUNT: CPT | Performed by: NURSE PRACTITIONER

## 2025-03-27 PROCEDURE — 99215 OFFICE O/P EST HI 40 MIN: CPT | Mod: S$PBB,,, | Performed by: NURSE PRACTITIONER

## 2025-03-27 PROCEDURE — 99999 PR PBB SHADOW E&M-EST. PATIENT-LVL III: CPT | Mod: PBBFAC,,, | Performed by: NURSE PRACTITIONER

## 2025-03-27 PROCEDURE — 99213 OFFICE O/P EST LOW 20 MIN: CPT | Mod: PBBFAC | Performed by: NURSE PRACTITIONER

## 2025-03-27 RX ORDER — TROSPIUM CHLORIDE 20 MG/1
20 TABLET, FILM COATED ORAL 2 TIMES DAILY
COMMUNITY

## 2025-03-27 NOTE — PROGRESS NOTES
[facility] - UROG[facility]EC[facility]  [address] [place] [id number]    Laura Thomas  [id number]  1955 March 27, 2025    Consulting Physician: No ref. provider found   GYN: none  Primary M.D.: Blaze Muhammad MD    Chief Complaint   Patient presents with    Pessary Check       CC: nocturia/UUI    11/15/2024  History of Present Illness:    1)  Urinary incontinence:  --Do you leak have stress urinary leakage (leaking with cough, sneeze, exercise physical activity, etc)?  yes   --Do leak have urge urinary leakage (run to the bathroom but can't get there in time)? yes   --Which is worse? equal (if they are about equal, write equal)  --How many pads do you go through a day? 1/day  --How wet are your pads? minimum wetness   --How many pads do you go through a night? 1/day  --How wet are your pads? severe wetness . When she wakes up and gets out of bed she leaks  --How often to you urinate during the day?  Q 2 hours.    --How many times do you urinate at night/during sleeping hours? 3  --Do you have any pain or burning with urination? no   --Do you have any blood that you can see in your urine? no   --Do you get frequent bladder infections/UTIs? (>3 in 1 year or 2 in 6 months per documented, positive urine culture): no   --When urinating, do you feel like you empty your bladder completely? yes     --UDS 5/2024 ([facility]): low capacity, +DO; no BRANDON (report scanned into EPIC)  --tried oxybutynin--helped but had some trouble emptying  --tried mirabegron--helped but was too expensive    2)  Pelvic organ prolapse:  --Do you notice something bulging from your vagina? no   --If so, how far does it come out? Above, To, or Past the vaginal opening  --Do you have symptoms from the bulge?    --If so, what are those symptoms?   --Are you having any vaginal bleeding or abnormal menstrual bleeding? no   --Are you having any bothersome vaginal discharge? no   --Are you sexually active? yes   --Do you have pain with  intercourse? no   --If so, please describe the pain:   --Do you have vaginal dryness? yes    --If so, do you use vaginal estrogen cream for the dryness? yes     --2010: SANDRINE/BSO/McCalls + ant/post Avaulta (mesh)   --surgeon comments did not want to repeat TVT due to previous one and difficulty  --2000: sounds like had TVT    3)  Bowel habits:    --Are you often constipated? yes    --if eats fiber gummy + takes prunes daily, it's controlled  --Do you often have to strain with your bowel movements? no   --Is your stool consistency often hard/pellet-like? no   --Do you have diarrhea or loose stool on a regular basis? no   --Do you notice blood in your stool? no   --Do you loose control of your bowel movements on a regular basis? no   --Do you every have a strong urge to have a bowel movement and have a small amount of stool in your underwear when you get to the bathroom? no   --When you have a bowel movement, do you feel like you empty completely? yes    --If not, do you push in or around the vagina/anus to help empty? no   --Do you ever have tissue bulging from the anus that stays out? no     POP-Q:  Aa 0; Ba 0; C -8; Ap 0; Bp 0.  Genital hiatus 4, perineal body 3, total vaginal length 10-11.    PVR: 60 mL    02/13/2025  She has yet to start Sanctura. Has stopped fluids prior to bedtime w/ relief. Taking Colace for bowel movements with overall relief. Has been using vaginal estrogen but not consistently. Would like to manage pessary herself. Is still sexually active w/ .    Changes since last visit  HPI:     1)  UI:  (--) BRANDON  no (+) UUI.  (+) pads:2/day, usually minimum wetness and 1/night usually minimum wetness.  Daytime frequency: Q 2 hours.  Nocturia: Yes: 3/night.   (--) dysuria,  (--) hematuria,  (+) frequent UTIs.  (+) complete bladder emptying.   --Taking Sanctura once a day x 3-4 weeks -- helping with UUI  --Nocturia frequency the same but can make it to the toilet without leaking   --Overall frequency  is the same, but UUI improving     2)  POP:  Absent. above introitus.  Symptoms:(--).  (--) vaginal bleeding. (--) vaginal discharge. (+) sexually active.   (--)  Vaginal dryness.  (+) vaginal estrogen use.   --Having difficulty removing and reinserting pessary herself   --Using vaginal estrogen consistently   --Does feel like she empties faster with the pessary in place, but can empty with relaxation techniques     3)  BM:  (--) constipation/straining.  (--) chronic diarrhea. (--) hematochezia.  (--) fecal incontinence.  (+) fecal smearing/urgency.  (--) incomplete evacuation.    --Taking Colace/Senakote daily   --Interested in Fluid   --Occasional fiber supplement -- tsp in orange juice - not regularly      4) Frequent UTIs (bladder infections):  --symptoms:no, not currently   --related in intercourse: N/A  --urinating before/after intercourse: N/A  --related to menstrual cycle: N/A  --using estrogen cream:yes  --using probiotic, cranberry, dmannose: --  --prior  tract imaging: N/A  --prior cystoscopy: N/A  --recent urine cultures:     Urine Culture   11/15/2024 >/= 100,000 colonies/ml Escherichia coli !    2/13/2025 ESCHERICHIA COLI !        Past Medical History:  Past Medical History:   Diagnosis Date    Arthritis     legs and hands    Hiatal hernia     Hypertension     Umbilical hernia        Past Surgical History:  Past Surgical History:   Procedure Laterality Date    BLADDER SURGERY      CORONARY ANGIOPLASTY WITH STENT PLACEMENT Left 8/6/2024    Procedure: ANGIOPLASTY WITH STENT PLACEMENT;  Surgeon: Josh Howard MD;  Location: [facility] CATH;  Service: Cardiology;  Laterality: Left;  Schedule for LHC/coronary angio/possible [place] [facility] [place]STERECTOMY      LEFT HEART CATHETERIZATION Left 8/6/2024    Procedure: Left heart cath;  Surgeon: Josh Howard MD;  Location: [facility] CATH;  Service: Cardiology;  Laterality: Left;  Schedule for LHC/coronary angio/possible PCI[place] [facility] [place]     REPAIR OF MENISCUS OF KNEE     --PP BTL  --2010: SANDRINE/BSO/McCalls + ant/post Avaulta (mesh)   --surgeon comments did not want to repeat TVT due to previous one and difficulty  --2000: sounds like had TVT    Hysterectomy: yes   Date: 2010.  Indication: abnormal bleeding and prolapse bladder.    Type: TVH  Cervix present: unknown   Ovaries present: no   --2010: SANDRINE/BSO/McCalls + ant/post Avaulta (mesh)   --surgeon comments did not want to repeat TVT due to previous one and difficulty    Avaulta Plus® is a monofilament, four arm polypropylene mesh coated in the central part with a porous, cellular crosslinked collagen barrier, used for anterior wall prolapse. 272 POALO. Ariel et al. The polypropylene mesh is made of small fibers, which provide a thin cross section of the mesh without loss of strength and may also contribute to decreasing the size of encapsulation. The central part of the polypropylene mesh, but not the outer limit, is covered with a layer of porcine collagen.    Anterior Avaulta:   The endopelvic connective tissue was sharply  from the vaginal epithelium-first to the pubic ramus and then down to the level of the ischial spines. An anterior colporrhaphy was then performed using a delayed absorbable suture in an interrupted technique.  Bilateral small groin incisions were made at the most superior and inferior aspects of the medial border of the obturator foramen. The Avaulta Solo trocars were then sequentially passed through these incisions, with the superior incisions serving as the site for the distal mesh arms, and the inferior incisions for the proximal arms. Care was taken to place the proximal arms of the mesh through the obturator internus muscles approximately 1 cm above the ischial spines.  The mesh was loosely positioned by gently pulling on each arm, and then tacked down to the anterior colporrhaphy in the midline using delayed absorbable sutures.     Posterior Avaulta:  For placement  of the posterior Avaulta Solo devices, a similar hydrodissection was performed followed by a vertical incision through the full thickness of the vaginal epithelium-with care taken to enter the true rectovaginal space. Bilateral stab incisions were made in the buttocks 3 cm lateral to and 3 cm distal from the anus. Trocars were passed through these stab incisions horizontally into the ischiorectal fossa under finger guidance. The trocars were placed through the levator muscles just lateral to the rectum and just medial to the ischial spines, and the superior mesh arms were then set into place. The same buttocks incisions were used for the distal trocar passes. These passes were also performed via finger guidance-allowing the surgeon to pass the trocar from the buttocks to the perineal body bilaterally. The distal landmark for trocar placement was the junction between the bulbocavernosus and transverse perineal muscles. The mesh was then positioned and tacked down in the midline via delayed absorbable sutures. After each posterior needle pass (and before the mesh was actually pulled into place) a digital rectal examination was performed to make sure that no penetration of the rectum had occurred.      Obstetrical History:    OB History    Para Term  AB Living   3             SAB IAB Ectopic Multiple Live Births                  # Outcome Date GA Lbr Alex/2nd Weight Sex Type Anes PTL Lv   3       Vag-Spont      2       Vag-Spont      1       Vag-Spont        # of vaginal deliveries: 3  # of  sections:   Largest infant weight: 9.10  Use of forceps or vacuum to assist any delivery: yes   Episiotomy with any delivery: yes     Gynecological History:  LMP: No LMP recorded. Patient has had a hysterectomy.  Age of menarche: 12  Age of menopause:   Last pap test: year: .  Result: Normal; now post hyst  History of abnormal paps: no    History of STIs:  no   Mammogram: Date of  last: 2023.  Result: Normal  Colonoscopy: Date of last: 2023.  Result:  Normal.  Repeat due:  2028.   DEXA:  Date of last: 2023.  Result:  Normal.  Repeat due:  2025.     Family History:  Family History   Problem Relation Name Age of Onset    Heart attack Mother      Kidney cancer Father        Colon CA: No  Breast CA: No  GYN CA (ovary, uterine): No   CA (kidney, bladder): Yes - Father    Social History:  Social History     Socioeconomic History    Marital status:    Tobacco Use    Smoking status: Never    Smokeless tobacco: Never   Substance and Sexual Activity    Alcohol use: Not Currently     Comment: occasionally    Drug use: Never     The patient is .  Resides in [place] [id number].  Employment status: retired  for [employer] in [place], [place].    Allergies  Review of patient's allergies indicates:   Allergen Reactions    Levaquin [levofloxacin] Other (See Comments)    Rosuvastatin        Medications  Current Outpatient Medications on File Prior to Visit   Medication Sig Dispense Refill    albuterol (PROVENTIL/VENTOLIN HFA) 90 mcg/actuation inhaler Inhale 2 puffs into the lungs every 6 (six) hours as needed for Wheezing. Rescue      ALPRAZolam (XANAX) 0.25 MG tablet Take 0.25 mg by mouth daily as needed for Anxiety.      aspirin (ECOTRIN) 81 MG EC tablet Take 81 mg by mouth once daily.      buPROPion (WELLBUTRIN XL) 150 MG TB24 tablet Take 150 mg by mouth once daily.      cephALEXin (KEFLEX) 250 MG capsule Take 1 capsule (250 mg total) by mouth once daily. 30 capsule 0    estradioL (ESTRACE) 0.01 % (0.1 mg/gram) vaginal cream 0.5 grams with applicator or dime-sized amount with finger in vagina nightly x 2 weeks, then twice a week thereafter 42.5 g 11    eszopiclone (LUNESTA) 2 MG Tab Take 2 mg by mouth nightly as needed (insomnia).      ezetimibe (ZETIA) 10 mg tablet Take 10 mg by mouth once daily.      fluticasone propionate (FLONASE) 50 mcg/actuation nasal spray 1 spray by Each  "Nostril route daily as needed for Rhinitis.      HYDROcodone-acetaminophen (NORCO) 5-325 mg per tablet Take 1 tablet by mouth every 4 (four) hours as needed for Pain. 30 tablet 0    inclisiran (LEQVIO) 284 mg/1.5 mL Syrg subcutaneous injection Inject 284 mg into the skin every 6 (six) months.      losartan (COZAAR) 100 MG tablet Take 100 mg by mouth once daily.      meloxicam (MOBIC) 15 MG tablet Take 7.5 mg by mouth daily as needed for Pain.      metoprolol succinate (TOPROL-XL) 25 MG 24 hr tablet Take 25 mg by mouth once daily.      omega-3 fatty acids/fish oil (FISH OIL-OMEGA-3 FATTY ACIDS) 300-1,000 mg capsule Take 1 capsule by mouth once daily.      pravastatin (PRAVACHOL) 40 MG tablet Take 80 mg by mouth once daily.      trospium (SANCTURA) 20 mg Tab tablet Take 20 mg by mouth 2 (two) times daily.       No current facility-administered medications on file prior to visit.       Review of Systems   See HPI    Urogynecologic Exam  BP (!) 140/90   Ht 5' 3" (1.6 m)   Wt 105.9 kg (233 lb 7.5 oz)   BMI 41.36 kg/m²     GENERAL APPEARANCE:  The patient is a well-developed, well-nourished female.    PELVIC:    External genitalia:  Normal Bartholins, Skenes and labia bilaterally.    Urethra:  No caruncle, diverticulum or masses.    Vagina:  Atrophy (+) , no bladder masses or tender, no discharge.    Cervix:  absent  Uterus: uterus absent  Adnexa: Not examined.    POP-Q:    Deferred.      RECTAL:    Deferred    PVR: 20 mL w/ pessary in place     The patient was fit with #4 ring w/ support pessary.  She was able to tolerate the device comfortabley with bending, squatting, valsalva.  She was not able to demonstrate independent removal and placement.  She tolerated the procedure well.          Impression    1. Pessary maintenance    2. Cystocele, midline    3. Rectocele, female    4. Mixed stress and urge urinary incontinence    5. Nocturia more than twice per night    6. Chronic constipation        1)  Mixed urinary " incontinence, urge > stress: urinary urgency/frequency stable, UUI improving  --urine C&S (catheterized specimen)  --Empty bladder every 3 hours.  Empty well: wait a minute, lean forward on toilet.    --Avoid dietary irritants (see sheet).    --KEGELS: do 10 in AM and 10 in PM, holding each x 10 seconds.  When you feel urge to go, STOP, KEGEL, and when urge has passed, then go to bathroom.  Consider PT in future.    --URGE: Continue sanctura 20 mg 1x/day. Increase to 2x/day. Takes 2-4 weeks to see if will have effect.  For dry mouth: get sour, sugar free lozenge or gum.  --> Not started    --oxybutynin helped but had trouble emptying   --mirabegron helped bu too expensive  --STRESS:  Pessary vs. Sling.     2)  Nocturia (nighttime urination), UUI on way to BR: frequency stable, UUI on way to BR improving  --stop fluids 2 hours before bed: drinks 2 large Yetis before bed and during night   --no water by bed  --If have leg swelling:  Elevate feet above chest x 1 hour before bed to get excess fluid off.  Can also use support hose (knee highs).    --see if pessary helps    3)  Constipation:  --hydrate well  Controlling constipation may help bladder urgency/leakage and fiber may better control cholesterol and blood glucose.  Start daily fiber.  Take 1 tsp of fiber powder (psyllium or other sugar-free powder).  Mix in 8 oz of water.  Take x 3-5 days.  Then, increase fiber by 1 tsp every 3-5 days until stool is easy to pass.  Stop and continue at that dose.   Do not exceed 6 tsps/day.  May also use over the counter stool softener 1-2 x/day.  AVOID laxatives.  --straining can make vaginal bulge and umbilical hernia worse    4)  Umbilical hernia:  --s/p repair     5)  Stage 2 cystocele/rectocele, recurrent:  --#4 ring w/ support pessary - placed today; unable to manage independently   --2010: SANDRINE/BSO/McCalls + ant/post Avaulta (mesh)   --surgeon comments did not want to repeat TVT due to previous one and difficulty   --Seems  like Avaulta has mesh over entire anterior/posterior compartments: not sure would be able to do anterior/posterior repair--probably needs R-ASC  --PVR normal w/o pessary; does feel some symptoms of incomplete emptying (PV to help) and U/F with nighttime UUI   --urethra kinked some with passage of I/O cath--may be from POP but TVT could make worse    --smooth passage of catheter w/ pessary in place   --is bulge related to nighttime symptoms?   --could consider trial of pessary to see if helps symptoms   --if does, could consider surgical repair    6)  Vaginal atrophy (dryness):    --continue Vaginal estrogen:  --Use 0.5 grams of estrogen cream in vagina with applicator or dime-sized amount with finger (as far as can reach internally) nightly twice a week thereafter.  You can also apply a dime-sized amount with your finger around the vaginal opening and inner lips at same frequency.     --Vaginal estrogen may help to decrease pain related to dryness with intercourse and urinary symptoms (urgency/frequency/UTIs) around menopause.   --sign up for [employer] pham online--tube should be $20-30  Pharmacy Contact    Telephone Fax   [phone]     Pharmacy Address and Hours    Address Hours   [address] [place] [address]        RTC in 3 months for pessary check (prefers [facility])    Face to Face time with patient: 45    55 minutes of total time spent on the encounter, which includes face to face time and non-face to face time preparing to see the patient (eg, review of tests), Obtaining and/or reviewing separately obtained history, Documenting clinical information in the electronic or other health record, Independently interpreting results (not separately reported) and communicating results to the patient/family/caregiver, or Care coordination (not separately reported).       Tatiana Moon  Female Pelvic Medicine and Reconstructive Surgery  [facility] [place],  [place]

## 2025-03-27 NOTE — PATIENT INSTRUCTIONS
PESSARY CARE: Remove pessary as frequently as nightly and as infrequently as every 2-3 weeks. Remove before intercourse. May need to remove before bowel movements if straining dislodges. Wash with soap and water (no ). Replace using small amount of water-based lubricant (like KY jelly) at end being introduced into vagina.     Inserting the pessary  Wash your hands.  The notches inside the open ring and the openings in the ring-with-support are the flexible points. Grasp the device midway between these points and fold the pessary in half. The curved part should be facing the ceiling, like a taco. Put a small amount of water-soluble lubricant, such as KY Jelly, on the insertion edge.   Hold the folded pessary in one hand and spread the lips of your vagina with the other hand. Gently push the pessary as far back into the vagina as it will go. You can do this squatting, standing with one foot propped on the tub or toilet, or sitting with your feet propped up.    Removing the pessary  Wash your hands.  Find the rim of the pessary just under the pubic bone at the front of your vagina. Locate the notch or opening and hook your finger under or over the rim.  Tilt the pessary slightly, to about a 30 degree angle, and gently pull down and out of the vagina. If you can fold the pessary somewhat, it will ease the removal.  Bearing down as if you are having a bowel movement can help push the rim of the pessary forward so you can grasp it more easily.    1)  Mixed urinary incontinence, urge > stress:    --urine C&S  --Empty bladder every 3 hours.  Empty well: wait a minute, lean forward on toilet.    --Avoid dietary irritants (see below).  Keep diary x 3-5 days to determine your irritants.  --KEGELS: do 10 in AM and 10 in PM, holding each x 10 seconds.  When you feel urge to go, STOP, KEGEL, and when urge has passed, then go to bathroom.  Consider PT in future.    --URGE: Continue sanctura 20 mg up to 1x/day. Try TWICE  A DAY. Takes 2-4 weeks to see if will have effect.  For dry mouth: get sour, sugar free lozenge or gum.  --> Not started    --oxybutynin helped but had trouble emptying   --mirabegron helped but too expensive  --STRESS:  Pessary vs. Sling.     2)  Nocturia (nighttime urination), UUI on way to BR:  --stop fluids 2 hours before bed: drinks 2 large Yetis before bed and during night   --no water by bed  --If have leg swelling:  Elevate feet above chest x 1 hour before bed to get excess fluid off.  Can also use support hose (knee highs).    --see if pessary helps    3)  Constipation:   --hydrate well  Controlling constipation may help bladder urgency/leakage and fiber may better control cholesterol and blood glucose.  Start daily fiber.  Take 1 tsp of fiber powder (psyllium or other sugar-free powder).  Mix in 8 oz of water.  Take x 3-5 days.  Then, increase fiber by 1 tsp every 3-5 days until stool is easy to pass.  Stop and continue at that dose.   Do not exceed 6 tsps/day.  May also use over the counter stool softener 1-2 x/day.  AVOID laxatives.  --straining can make vaginal bulge and umbilical hernia worse    4)  Umbilical hernia:  --s/p repair     5)  Stage 2 cystocele/rectocele, recurrent:  ----#4 ring w/ support pessary   --2010: SANDRINE/BSO/Mariah + ant/post Avaulta (mesh)   --surgeon comments did not want to repeat TVT due to previous one and difficulty   --Seems like Avaulta has mesh over entire anterior/posterior compartments: not sure would be able to do anterior/posterior repair--probably needs R-ASC  --PVR normal today; does feel some symptoms of incomplete emptying (PV to help) and U/F with nighttime UUI   --urethra kinked some with passage of I/O cath--may be from POP but TVT could make worse    --smooth passage of catheter w/ pessary in place   --is bulge related to nighttime symptoms?   --could consider trial of pessary to see if helps symptoms   --if does, could consider surgical repair    6)  Vaginal  atrophy (dryness):    --start Vaginal estrogen:  --Use 0.5 grams of estrogen cream in vagina with applicator or dime-sized amount with finger (as far as can reach internally) nightly then twice a week thereafter.  You can also apply a dime-sized amount with your finger around the vaginal opening and inner lips at same frequency.     --Vaginal estrogen may help to decrease pain related to dryness with intercourse and urinary symptoms (urgency/frequency/UTIs) around menopause.   --sign up for [employer] pham online--tube should be $20-30  Pharmacy Contact    Telephone Fax   [phone]     Pharmacy Address and Hours    Address Hours   [address] [id number]      Bladder Irritants  Certain foods and drinks have been associated with worsening symptoms of urinary frequency, urgency, urge incontinence, or bladder pain. If you suffer from any of these conditions, you may wish to try eliminating one or more of these foods from your diet and see if your symptoms improve. If bladder symptoms are related to dietary factors, strict adherence to a diet thateliminates the food should bring marked relief in 10 days. Once you are feeling better, you can begin to add foods back into your diet, one at a time. If symptoms return, you will be able to identify the irritant. As you add foods back to your diet it is very important that you drink significant amounts of water.     -----------------------------------------------------------------------------------------------  List of Common Bladder Irritants*  Alcoholic beverages  Apples and apple juice  Cantaloupe  Carbonated beverages  Chili and spicy foods  Chocolate  Citrus fruit  Coffee (including decaffeinated)  Cranberries and cranberry juice  Grapes  Guava  Milk Products: milk, cheese, cottage cheese, yogurt, ice cream  Peaches  Pineapple  Plums  Strawberries  Sugar especially artificial sweeteners, saccharin, aspartame, corn  sweeteners, honey, fructose, sucrose, lactose  Tea  Tomatoes and tomato juice  Vitamin B complex  Vinegar  *Most people are not sensitive to ALL of these products; your goal is to find the foods that make YOUR symptoms worse.  ---------------------------------------------------------------------------------------------------     Low-acid fruit substitutions include apricots, papaya, pears and watermelon. Coffee drinkers can drink Kava or other lowacid instant drinks. Tea drinkers can substitute non-citrus herbal and sun brewed teas. Calcium carbonate co-buffered with calcium ascorbate can be substituted for Vitamin C. Prelief is a dietary supplement that works as an acid blocker for the bladder.

## 2025-03-29 LAB — BACTERIA UR CULT: NO GROWTH

## 2025-03-31 ENCOUNTER — RESULTS FOLLOW-UP (OUTPATIENT)
Dept: OBSTETRICS AND GYNECOLOGY | Facility: CLINIC | Age: 70
End: 2025-03-31

## 2025-04-16 ENCOUNTER — TELEPHONE (OUTPATIENT)
Dept: UROGYNECOLOGY | Facility: CLINIC | Age: 70
End: 2025-04-16
Payer: MEDICARE

## 2025-04-16 NOTE — TELEPHONE ENCOUNTER
----- Message from [place] sent at 4/16/2025  2:13 PM CDT -----  Name of Who is Calling: MELISSA GIBSON [[id number]]What is the request in detail: Pt called regarding medical necessity form for pessary because it should've been charged through her insurance.Please contact to further discuss and advise.Can the clinic reply by [facility]: YWhat Number to Call Back if not in [facility]:  [phone]

## 2025-04-16 NOTE — TELEPHONE ENCOUNTER
Returned call- Informed patient pessary would have to come from [facility] to be a covered benefit by insurance. Due to pessary being ordered from outside source, [employer], insurance will not cover. Patient states she is aware but wanted to confirm. Requested [facility] start working with a pessary company that covers medicare.     ----- Message from Carlie sent at 2025  1:59 PM CDT -----  [phone]  Type: Patient Call BackWho called: self What is the request in detail: pt stated she was told by her insurance the office needed to bill them for the ring she was told by the office she needed to pay for it then and there. Tranf her to billing but pt also wanted the office to call her back. Can the clinic reply by [facility]? noWould the patient rather a call back or a response via [facility]? Call back Best call back number:[phone]